# Patient Record
Sex: FEMALE | Race: WHITE | NOT HISPANIC OR LATINO
[De-identification: names, ages, dates, MRNs, and addresses within clinical notes are randomized per-mention and may not be internally consistent; named-entity substitution may affect disease eponyms.]

---

## 2021-06-10 PROBLEM — Z00.00 ENCOUNTER FOR PREVENTIVE HEALTH EXAMINATION: Status: ACTIVE | Noted: 2021-06-10

## 2021-06-23 ENCOUNTER — APPOINTMENT (OUTPATIENT)
Dept: CARDIOLOGY | Facility: HOSPITAL | Age: 71
End: 2021-06-23

## 2021-06-23 ENCOUNTER — OUTPATIENT (OUTPATIENT)
Dept: OUTPATIENT SERVICES | Facility: HOSPITAL | Age: 71
LOS: 1 days | End: 2021-06-23
Payer: MEDICARE

## 2021-06-23 DIAGNOSIS — R07.9 CHEST PAIN, UNSPECIFIED: ICD-10-CM

## 2021-06-23 PROCEDURE — 75574 CT ANGIO HRT W/3D IMAGE: CPT | Mod: 26,MH

## 2021-06-23 PROCEDURE — 75574 CT ANGIO HRT W/3D IMAGE: CPT

## 2023-03-06 ENCOUNTER — OFFICE (OUTPATIENT)
Dept: URBAN - METROPOLITAN AREA CLINIC 109 | Facility: CLINIC | Age: 73
Setting detail: OPHTHALMOLOGY
End: 2023-03-06
Payer: MEDICARE

## 2023-03-06 DIAGNOSIS — H35.54: ICD-10-CM

## 2023-03-06 DIAGNOSIS — H18.423: ICD-10-CM

## 2023-03-06 DIAGNOSIS — H47.233: ICD-10-CM

## 2023-03-06 DIAGNOSIS — H43.393: ICD-10-CM

## 2023-03-06 PROCEDURE — 92250 FUNDUS PHOTOGRAPHY W/I&R: CPT | Performed by: OPHTHALMOLOGY

## 2023-03-06 PROCEDURE — 92014 COMPRE OPH EXAM EST PT 1/>: CPT | Performed by: OPHTHALMOLOGY

## 2023-03-06 ASSESSMENT — SPHEQUIV_DERIVED
OS_SPHEQUIV: 0.5
OD_SPHEQUIV: 0.5
OS_SPHEQUIV: 0.5
OD_SPHEQUIV: 0.875
OS_SPHEQUIV: 3
OD_SPHEQUIV: 0.375
OS_SPHEQUIV: 0.125
OD_SPHEQUIV: 0.5

## 2023-03-06 ASSESSMENT — REFRACTION_MANIFEST
OD_ADD: +2.50
OD_VA2: 20/20(J1+)
OS_SPHERE: +0.75
OD_AXIS: 120
OS_VA2: 20/20(J1+)
OD_CYLINDER: -0.50
OD_CYLINDER: -0.50
OS_CYLINDER: -0.50
OD_ADD: +2.50
OD_AXIS: 125
OD_AXIS: 120
OS_VA1: 20/20
OD_VA1: 20/20
OS_CYLINDER: -0.50
OD_CYLINDER: -0.75
OD_VA2: 20/20(J1+)
OD_SPHERE: +0.75
OS_AXIS: 075
OS_ADD: +2.50
OD_VA1: 20/25-
OD_SPHERE: +1.25
OS_SPHERE: +0.75
OS_ADD: +2.50
OD_ADD: +2.25
OS_AXIS: 075
OS_AXIS: 110
OS_VA2: 20/20(J1+)
OS_VA2: 20/20-
OS_VA1: 20/20-
OS_ADD: +2.25
OU_VA: 20/20-
OS_SPHERE: +3.25
OD_SPHERE: +0.75
OD_VA1: 20/20
OD_VA2: 20/20-
OS_CYLINDER: -0.50
OS_VA1: 20/20

## 2023-03-06 ASSESSMENT — SUPERFICIAL PUNCTATE KERATITIS (SPK)
OD_SPK: 2+
OS_SPK: 2+

## 2023-03-06 ASSESSMENT — PACHYMETRY
OS_CT_CORRECTION: -1
OD_CT_CORRECTION: -2
OD_CT_UM: 577
OS_CT_UM: 566

## 2023-03-06 ASSESSMENT — CONFRONTATIONAL VISUAL FIELD TEST (CVF)
OS_FINDINGS: FULL
OD_FINDINGS: FULL

## 2023-03-06 ASSESSMENT — REFRACTION_AUTOREFRACTION
OS_AXIS: 060
OD_CYLINDER: -0.75
OD_SPHERE: +0.75
OD_AXIS: 123
OS_CYLINDER: -0.75
OS_SPHERE: +0.50

## 2023-03-06 ASSESSMENT — AXIALLENGTH_DERIVED
OS_AL: 21.989
OS_AL: 22.869
OD_AL: 22.8181
OD_AL: 23.0018
OS_AL: 23.0071
OD_AL: 22.9556
OD_AL: 22.9556
OS_AL: 22.869

## 2023-03-06 ASSESSMENT — REFRACTION_CURRENTRX
OS_CYLINDER: SPH
OD_SPHERE: +2.50
OS_OVR_VA: 20/
OD_CYLINDER: SPH
OS_SPHERE: +2.50
OD_OVR_VA: 20/

## 2023-03-06 ASSESSMENT — VISUAL ACUITY
OD_BCVA: 20/20-1
OS_BCVA: 20/20-2

## 2023-03-06 ASSESSMENT — LID EXAM ASSESSMENTS
OD_BLEPHARITIS: RLL RUL 1+
OS_BLEPHARITIS: LLL LUL 1+

## 2023-03-06 ASSESSMENT — KERATOMETRY
OD_K1POWER_DIOPTERS: 44.50
OS_K1POWER_DIOPTERS: 44.50
OD_K2POWER_DIOPTERS: 45.00
OD_AXISANGLE_DEGREES: 047
OS_K2POWER_DIOPTERS: 45.50
OS_AXISANGLE_DEGREES: 105

## 2023-03-06 ASSESSMENT — TONOMETRY
OD_IOP_MMHG: 18
OS_IOP_MMHG: 18

## 2023-05-18 ENCOUNTER — APPOINTMENT (OUTPATIENT)
Dept: ORTHOPEDIC SURGERY | Facility: CLINIC | Age: 73
End: 2023-05-18
Payer: MEDICARE

## 2023-05-18 VITALS — WEIGHT: 133 LBS | HEIGHT: 63 IN | BODY MASS INDEX: 23.57 KG/M2

## 2023-05-18 DIAGNOSIS — M19.042 PRIMARY OSTEOARTHRITIS, LEFT HAND: ICD-10-CM

## 2023-05-18 DIAGNOSIS — M18.11 UNILATERAL PRIMARY OSTEOARTHRITIS OF FIRST CARPOMETACARPAL JOINT, RIGHT HAND: ICD-10-CM

## 2023-05-18 DIAGNOSIS — M18.12 UNILATERAL PRIMARY OSTEOARTHRITIS OF FIRST CARPOMETACARPAL JOINT, LEFT HAND: ICD-10-CM

## 2023-05-18 DIAGNOSIS — M19.041 PRIMARY OSTEOARTHRITIS, RIGHT HAND: ICD-10-CM

## 2023-05-18 DIAGNOSIS — E11.9 TYPE 2 DIABETES MELLITUS W/OUT COMPLICATIONS: ICD-10-CM

## 2023-05-18 PROCEDURE — 73130 X-RAY EXAM OF HAND: CPT | Mod: LT

## 2023-05-18 PROCEDURE — J3490M: CUSTOM | Mod: NC

## 2023-05-18 PROCEDURE — 20600 DRAIN/INJ JOINT/BURSA W/O US: CPT | Mod: 50

## 2023-05-18 PROCEDURE — 99213 OFFICE O/P EST LOW 20 MIN: CPT | Mod: 25

## 2023-05-18 NOTE — PHYSICAL EXAM
[de-identified] : R hand: \par +thumb CMC swelling \par +thumb CMC tenderness \par Decreased thumb ROM \par +Basal grind test\par \par L hand: \par +thumb CMC swelling \par +thumb CMC tenderness \par Decreased thumb ROM \par +Basal grind test\par \par xrays bilateral hands severe thumb CMC

## 2023-05-18 NOTE — HISTORY OF PRESENT ILLNESS
[Gradual] : gradual [7] : 7 [5] : 5 [Dull/Aching] : dull/aching [Radiating] : radiating [Tingling] : tingling [Nothing helps with pain getting better] : Nothing helps with pain getting better [de-identified] : Bilateral thumb CMC pain and swelling\par hand pain  [] : no [FreeTextEntry1] : hands/ thumbs  [FreeTextEntry3] : few years  [FreeTextEntry5] : she has pain and numbness [de-identified] : activity

## 2023-05-18 NOTE — ASSESSMENT
[FreeTextEntry1] : Bilateral Thumb CMC injection was performed because of pain inflammation and stiffness\par Anesthesia: ethyl chloride sprayed topically\par Celestone 6mg: An injection of Celestone 1cc\par Lidocaine: An injection of Lidocaine 1% 1cc\par Marcaine: An injection of Marcaine 0.5% 1cc\par x2\par \par Patient has tried OTC's including aspirin, Ibuprofen, Aleve etc or prescription NSAIDS, and/or exercises at home and/ or\par physical therapy without satisfactory response.\par After verbal consent using sterile preparation and technique. The risks, benefits, and alternatives to cortisone injection\par were explained in full to the patient. Risks outlined include but are not limited to infection, sepsis, bleeding, scarring, skin\par discoloration, temporary increase in pain, syncopal episode, failure to resolve symptoms, allergic reaction, symptom\par recurrence, and elevation of blood sugar in diabetics. Patient understood the risks. All questions were answered. After\par discussion of options, patient requested an injection. Oral informed consent was obtained and sterile prep was done of the\par injection site. Sterile technique was utilized for the procedure including the preparation of the solutions used for the\par injection. Patient tolerated the procedure well. Advised to ice the injection site this evening.\par Prep with betadine locally to site. Sterile technique used

## 2023-06-27 ENCOUNTER — APPOINTMENT (OUTPATIENT)
Dept: ORTHOPEDIC SURGERY | Facility: CLINIC | Age: 73
End: 2023-06-27

## 2024-03-01 ENCOUNTER — APPOINTMENT (OUTPATIENT)
Dept: ORTHOPEDIC SURGERY | Facility: CLINIC | Age: 74
End: 2024-03-01
Payer: MEDICARE

## 2024-03-01 VITALS — BODY MASS INDEX: 23.04 KG/M2 | WEIGHT: 130 LBS | HEIGHT: 63 IN

## 2024-03-01 DIAGNOSIS — J45.909 UNSPECIFIED ASTHMA, UNCOMPLICATED: ICD-10-CM

## 2024-03-01 DIAGNOSIS — Z78.9 OTHER SPECIFIED HEALTH STATUS: ICD-10-CM

## 2024-03-01 DIAGNOSIS — M54.16 RADICULOPATHY, LUMBAR REGION: ICD-10-CM

## 2024-03-01 PROCEDURE — 72170 X-RAY EXAM OF PELVIS: CPT

## 2024-03-01 PROCEDURE — 72110 X-RAY EXAM L-2 SPINE 4/>VWS: CPT

## 2024-03-01 PROCEDURE — 99214 OFFICE O/P EST MOD 30 MIN: CPT

## 2024-03-01 RX ORDER — ALBUTEROL SULFATE 90 UG/1
INHALANT RESPIRATORY (INHALATION)
Refills: 0 | Status: ACTIVE | COMMUNITY

## 2024-03-01 RX ORDER — ALIROCUMAB 75 MG/ML
75 INJECTION, SOLUTION SUBCUTANEOUS
Refills: 0 | Status: ACTIVE | COMMUNITY

## 2024-03-01 RX ORDER — MONTELUKAST 10 MG/1
TABLET, FILM COATED ORAL
Refills: 0 | Status: ACTIVE | COMMUNITY

## 2024-03-01 RX ORDER — LEVOTHYROXINE SODIUM 0.17 MG/1
TABLET ORAL
Refills: 0 | Status: ACTIVE | COMMUNITY

## 2024-03-01 NOTE — HISTORY OF PRESENT ILLNESS
[Lower back] : lower back [Dull/Aching] : dull/aching [Localized] : localized [Constant] : constant [Nothing helps with pain getting better] : Nothing helps with pain getting better [Bending forward] : bending forward [de-identified] : 3/1/24: 72 yo f with worsening lower back pain in10/2023; no specific injury but she is taking care of her sick mom and aunt. Has had intermitent pain for years starting at age 12. The pain is across the lower back, but more to the right side of the back. Pain worse with getting up from a seated position.  No recent radiation of pain. No n/t.  No loss of b/b control.   She saw her PCP where an [facility] was [facility] She was given a muscle relaxant which she didnt take.  No PT/chirocare/acupuncture/injections/no prior spine surgeries  many allergies to antibiotics; amoxicillin, cipro, macrobid, omnicef, septra and bactrim  Pmhx: Asthma, osteoporosis, hypothyroid, hypoglycemia, hiatal hernia Pshx: Partial thyroidectomy, partial lobectomy, appendectomy   Had Thyroid cancer,  pre-cervical cancer No diabetes  Occupation: Retired  MRI L-spine 11/23/23 ([facility]) T11-12: Central disc herniation indents the thecal sac. No neuroforaminal stenosis. T12-L1: Central subligamentous disc herniation indents the thecal sac. No neuroforaminal stenosis. L1-2: Broad-based right central disc herniation impinges the thecal sac. No neuroforaminal stenosis. L2-3: Disc bulge indents the thecal sac. The bulge mildly narrows the neuroforamina. L3-4: Disc bulge indents the thecal sac. The bulge causes mild to moderate bilateral neuroforaminal stenosis. L4-5: Bilateral foraminal disc herniations cause moderate bilateral neuroforaminal stenosis impinging the exiting L4 nerves. Left foraminal annular tear. Superimposed disc bulge indents the thecal sac. Ligamentum flavum thickening. Severe facet arthropathy. L5-S1: Broad-based left subarticular and foraminal disc herniation indents the left lateral recess and causes severe left foraminal stenosis impinging exiting left L5 nerve. Superimposed disc bulge indents the thecal sac and causes moderate to severe right foraminal stenosis impinging the exiting right L5 nerve. The bulge indents the thecal sac. Severe facet arthropathy. Multiple probable cysts within the liver and kidneys measuring up to 2 cm. Consider abdominal ultrasound if clinically indicated  she is pending evaluation of the kidney/liver cysts with GI   xrays today: L spine - spondylosis , slight spondylolisthesis L4-5, LOSS OF disc hieght L5-S1  AP PELVIS - NEGATIVE  [] : no [FreeTextEntry5] : 72 yo F presenting with low back pain that started years ago. Felt numbness in b/l legs at age 12 after performing somersault. Pain has been present since. No radiating pain at this time. Saw PCP; had MRI L Spine completed @ [facility] 10/2023.

## 2024-03-01 NOTE — DATA REVIEWED
[MRI] : MRI [I independently reviewed and interpreted images and report] : I independently reviewed and interpreted images and report [Lumbar Spine] : lumbar spine [I reviewed the films/CD and agree] : I reviewed the films/CD and agree

## 2024-03-01 NOTE — DISCUSSION/SUMMARY
[Medication Risks Reviewed] : Medication risks reviewed [de-identified] : reviewed the case and the imaging with her  diffuse lumbar spondylosis with spondylolisthesis L4-5  rec PT   discussed the osteoporosis would rec she treat it - discussion of this today

## 2024-03-27 ENCOUNTER — OFFICE (OUTPATIENT)
Dept: URBAN - METROPOLITAN AREA CLINIC 109 | Facility: CLINIC | Age: 74
Setting detail: OPHTHALMOLOGY
End: 2024-03-27
Payer: MEDICARE

## 2024-03-27 DIAGNOSIS — H47.233: ICD-10-CM

## 2024-03-27 PROCEDURE — 92012 INTRM OPH EXAM EST PATIENT: CPT | Performed by: OPHTHALMOLOGY

## 2024-03-27 PROCEDURE — 92020 GONIOSCOPY: CPT | Performed by: OPHTHALMOLOGY

## 2024-03-27 PROCEDURE — 92133 CPTRZD OPH DX IMG PST SGM ON: CPT | Performed by: OPHTHALMOLOGY

## 2024-03-27 PROCEDURE — 92083 EXTENDED VISUAL FIELD XM: CPT | Performed by: OPHTHALMOLOGY

## 2024-03-27 ASSESSMENT — REFRACTION_CURRENTRX
OD_CYLINDER: SPH
OS_SPHERE: +2.50
OD_OVR_VA: 20/
OS_CYLINDER: SPH
OS_OVR_VA: 20/
OD_SPHERE: +2.50

## 2024-03-27 ASSESSMENT — REFRACTION_MANIFEST
OS_SPHERE: +3.25
OD_ADD: +2.50
OS_SPHERE: +0.75
OS_ADD: +2.50
OD_VA1: 20/20
OD_SPHERE: +0.75
OS_CYLINDER: -0.50
OD_SPHERE: +1.25
OD_ADD: +2.25
OS_VA2: 20/20(J1+)
OU_VA: 20/20-
OS_VA2: 20/20-
OD_SPHERE: +0.75
OD_CYLINDER: -0.75
OD_VA1: 20/25-
OD_VA2: 20/20(J1+)
OS_VA1: 20/20-
OS_VA1: 20/20
OD_VA2: 20/20(J1+)
OS_VA1: 20/20
OD_VA2: 20/20-
OS_ADD: +2.25
OD_AXIS: 120
OD_CYLINDER: -0.50
OS_CYLINDER: -0.50
OS_VA2: 20/20(J1+)
OD_CYLINDER: -0.50
OS_AXIS: 110
OS_CYLINDER: -0.50
OS_AXIS: 075
OS_ADD: +2.50
OD_AXIS: 125
OS_SPHERE: +0.75
OD_ADD: +2.50
OS_AXIS: 075
OD_VA1: 20/20
OD_AXIS: 120

## 2024-03-27 ASSESSMENT — SPHEQUIV_DERIVED
OD_SPHEQUIV: 0.875
OS_SPHEQUIV: 0.5
OS_SPHEQUIV: 3
OD_SPHEQUIV: 0.5
OD_SPHEQUIV: 0.5
OS_SPHEQUIV: 0.5

## 2024-03-27 ASSESSMENT — LID EXAM ASSESSMENTS
OS_BLEPHARITIS: LLL LUL 1+
OD_BLEPHARITIS: RLL RUL 1+

## 2024-06-24 ENCOUNTER — APPOINTMENT (OUTPATIENT)
Dept: ORTHOPEDIC SURGERY | Facility: CLINIC | Age: 74
End: 2024-06-24
Payer: MEDICARE

## 2024-06-24 DIAGNOSIS — M17.0 BILATERAL PRIMARY OSTEOARTHRITIS OF KNEE: ICD-10-CM

## 2024-06-24 DIAGNOSIS — M43.16 SPONDYLOLISTHESIS, LUMBAR REGION: ICD-10-CM

## 2024-06-24 PROCEDURE — 99214 OFFICE O/P EST MOD 30 MIN: CPT

## 2024-06-25 ENCOUNTER — APPOINTMENT (OUTPATIENT)
Dept: ORTHOPEDIC SURGERY | Facility: CLINIC | Age: 74
End: 2024-06-25

## 2025-04-02 ENCOUNTER — OFFICE (OUTPATIENT)
Dept: URBAN - METROPOLITAN AREA CLINIC 109 | Facility: CLINIC | Age: 75
Setting detail: OPHTHALMOLOGY
End: 2025-04-02
Payer: MEDICARE

## 2025-04-02 DIAGNOSIS — H43.393: ICD-10-CM

## 2025-04-02 DIAGNOSIS — H16.223: ICD-10-CM

## 2025-04-02 DIAGNOSIS — H47.233: ICD-10-CM

## 2025-04-02 DIAGNOSIS — H35.54: ICD-10-CM

## 2025-04-02 DIAGNOSIS — H18.423: ICD-10-CM

## 2025-04-02 PROBLEM — H01.001 BLEPHARITIS; RIGHT UPPER LID, RIGHT LOWER LID, LEFT UPPER LID, LEFT LOWER LID: Status: ACTIVE | Noted: 2025-04-02

## 2025-04-02 PROBLEM — H01.005 BLEPHARITIS; RIGHT UPPER LID, RIGHT LOWER LID, LEFT UPPER LID, LEFT LOWER LID: Status: ACTIVE | Noted: 2025-04-02

## 2025-04-02 PROBLEM — H01.002 BLEPHARITIS; RIGHT UPPER LID, RIGHT LOWER LID, LEFT UPPER LID, LEFT LOWER LID: Status: ACTIVE | Noted: 2025-04-02

## 2025-04-02 PROBLEM — H01.004 BLEPHARITIS; RIGHT UPPER LID, RIGHT LOWER LID, LEFT UPPER LID, LEFT LOWER LID: Status: ACTIVE | Noted: 2025-04-02

## 2025-04-02 PROCEDURE — 92083 EXTENDED VISUAL FIELD XM: CPT | Performed by: OPHTHALMOLOGY

## 2025-04-02 PROCEDURE — 68761 CLOSE TEAR DUCT OPENING: CPT | Mod: 50 | Performed by: OPHTHALMOLOGY

## 2025-04-02 PROCEDURE — 92014 COMPRE OPH EXAM EST PT 1/>: CPT | Mod: 25 | Performed by: OPHTHALMOLOGY

## 2025-04-02 PROCEDURE — 92250 FUNDUS PHOTOGRAPHY W/I&R: CPT | Performed by: OPHTHALMOLOGY

## 2025-04-02 PROCEDURE — 76514 ECHO EXAM OF EYE THICKNESS: CPT | Performed by: OPHTHALMOLOGY

## 2025-04-02 ASSESSMENT — REFRACTION_MANIFEST
OS_CYLINDER: -0.50
OS_VA1: 20/20-
OD_VA2: 20/20-
OS_CYLINDER: -0.50
OD_AXIS: 120
OD_ADD: +2.25
OD_VA2: 20/20(J1+)
OS_SPHERE: +3.25
OS_AXIS: 075
OS_SPHERE: +0.75
OS_AXIS: 110
OU_VA: 20/20-
OS_VA1: 20/20
OD_CYLINDER: -0.50
OS_AXIS: 075
OD_VA1: 20/20
OD_SPHERE: +1.25
OS_VA2: 20/20-
OD_ADD: +2.50
OD_SPHERE: +0.75
OS_ADD: +2.25
OD_AXIS: 125
OS_CYLINDER: -0.50
OD_VA2: 20/20(J1+)
OD_VA1: 20/20
OS_ADD: +2.50
OD_AXIS: 120
OD_VA1: 20/25-
OD_CYLINDER: -0.50
OS_VA2: 20/20(J1+)
OS_VA2: 20/20(J1+)
OD_SPHERE: +0.75
OS_ADD: +2.50
OS_VA1: 20/20
OD_ADD: +2.50
OD_CYLINDER: -0.75
OS_SPHERE: +0.75

## 2025-04-02 ASSESSMENT — SUPERFICIAL PUNCTATE KERATITIS (SPK)
OS_SPK: 2+
OD_SPK: 2+

## 2025-04-02 ASSESSMENT — REFRACTION_CURRENTRX
OS_CYLINDER: SPH
OS_SPHERE: +2.50
OD_SPHERE: +2.50
OD_OVR_VA: 20/
OS_OVR_VA: 20/
OD_CYLINDER: SPH

## 2025-04-02 ASSESSMENT — LID EXAM ASSESSMENTS
OS_BLEPHARITIS: LLL LUL 1+
OD_BLEPHARITIS: RLL RUL 1+

## 2025-04-02 ASSESSMENT — PACHYMETRY
OS_CT_UM: 602
OD_CT_CORRECTION: -4
OD_CT_UM: 601
OS_CT_CORRECTION: -4

## 2025-04-02 ASSESSMENT — KERATOMETRY
OD_K2POWER_DIOPTERS: 45.00
OD_AXISANGLE_DEGREES: 047
OS_K1POWER_DIOPTERS: 44.50
OD_K1POWER_DIOPTERS: 44.50
OS_K2POWER_DIOPTERS: 45.50
OS_AXISANGLE_DEGREES: 105

## 2025-04-02 ASSESSMENT — PUNCTA - ASSESSMENT
OS_PUNCTA: 3MON PLUG
OD_PUNCTA: 3MON PLUG

## 2025-04-02 ASSESSMENT — REFRACTION_AUTOREFRACTION
OD_CYLINDER: -0.75
OD_SPHERE: +0.50
OD_AXIS: 100
OS_SPHERE: +0.25
OS_AXIS: 062
OS_CYLINDER: -0.75

## 2025-04-02 ASSESSMENT — VISUAL ACUITY
OS_BCVA: 20/25-2
OD_BCVA: 20/25-1

## 2025-04-02 ASSESSMENT — TONOMETRY
OS_IOP_MMHG: 16
OD_IOP_MMHG: 16

## 2025-04-02 ASSESSMENT — CONFRONTATIONAL VISUAL FIELD TEST (CVF)
OS_FINDINGS: FULL
OD_FINDINGS: FULL

## 2025-04-14 ENCOUNTER — APPOINTMENT (OUTPATIENT)
Dept: ORTHOPEDIC SURGERY | Facility: CLINIC | Age: 75
End: 2025-04-14
Payer: MEDICARE

## 2025-04-14 VITALS — HEIGHT: 63 IN | BODY MASS INDEX: 26.05 KG/M2 | WEIGHT: 147 LBS

## 2025-04-14 DIAGNOSIS — M25.519 PAIN IN UNSPECIFIED SHOULDER: ICD-10-CM

## 2025-04-14 DIAGNOSIS — M54.2 CERVICALGIA: ICD-10-CM

## 2025-04-14 DIAGNOSIS — Z86.39 PERSONAL HISTORY OF OTHER ENDOCRINE, NUTRITIONAL AND METABOLIC DISEASE: ICD-10-CM

## 2025-04-14 DIAGNOSIS — M75.41 IMPINGEMENT SYNDROME OF RIGHT SHOULDER: ICD-10-CM

## 2025-04-14 DIAGNOSIS — M47.812 SPONDYLOSIS W/OUT MYELOPATHY OR RADICULOPATHY, CERVICAL REGION: ICD-10-CM

## 2025-04-14 PROCEDURE — 73030 X-RAY EXAM OF SHOULDER: CPT | Mod: RT

## 2025-04-14 PROCEDURE — 72040 X-RAY EXAM NECK SPINE 2-3 VW: CPT

## 2025-04-14 PROCEDURE — 99214 OFFICE O/P EST MOD 30 MIN: CPT

## 2025-04-14 PROCEDURE — 73010 X-RAY EXAM OF SHOULDER BLADE: CPT | Mod: RT

## 2025-04-14 RX ORDER — MELOXICAM 15 MG/1
15 TABLET ORAL
Qty: 30 | Refills: 0 | Status: ACTIVE | COMMUNITY
Start: 2025-04-14 | End: 1900-01-01

## 2025-06-30 ENCOUNTER — APPOINTMENT (OUTPATIENT)
Dept: ORTHOPEDIC SURGERY | Facility: CLINIC | Age: 75
End: 2025-06-30

## 2025-08-29 ENCOUNTER — APPOINTMENT (OUTPATIENT)
Dept: ORTHOPEDIC SURGERY | Facility: CLINIC | Age: 75
End: 2025-08-29
Payer: MEDICARE

## 2025-08-29 ENCOUNTER — RESULT CHARGE (OUTPATIENT)
Age: 75
End: 2025-08-29

## 2025-08-29 ENCOUNTER — APPOINTMENT (OUTPATIENT)
Dept: MRI IMAGING | Facility: CLINIC | Age: 75
End: 2025-08-29
Payer: MEDICARE

## 2025-08-29 VITALS — BODY MASS INDEX: 25.69 KG/M2 | WEIGHT: 145 LBS | HEIGHT: 63 IN

## 2025-08-29 DIAGNOSIS — S80.01XD CONTUSION OF RIGHT KNEE, SUBSEQUENT ENCOUNTER: ICD-10-CM

## 2025-08-29 DIAGNOSIS — S80.01XA CONTUSION OF RIGHT KNEE, INITIAL ENCOUNTER: ICD-10-CM

## 2025-08-29 PROCEDURE — 73564 X-RAY EXAM KNEE 4 OR MORE: CPT | Mod: 50

## 2025-08-29 PROCEDURE — 73721 MRI JNT OF LWR EXTRE W/O DYE: CPT | Mod: 26,RT

## 2025-08-29 PROCEDURE — G2211 COMPLEX E/M VISIT ADD ON: CPT

## 2025-08-29 PROCEDURE — 99213 OFFICE O/P EST LOW 20 MIN: CPT

## 2025-08-29 RX ORDER — DICLOFENAC SODIUM 10 MG/G
1 GEL TOPICAL 3 TIMES DAILY
Qty: 1 | Refills: 3 | Status: ACTIVE | COMMUNITY
Start: 2025-08-29 | End: 1900-01-01

## 2025-09-02 ENCOUNTER — APPOINTMENT (OUTPATIENT)
Dept: ORTHOPEDIC SURGERY | Facility: CLINIC | Age: 75
End: 2025-09-02
Payer: MEDICARE

## 2025-09-02 DIAGNOSIS — M17.12 UNILATERAL PRIMARY OSTEOARTHRITIS, LEFT KNEE: ICD-10-CM

## 2025-09-02 DIAGNOSIS — S82.001A UNSPECIFIED FRACTURE OF RIGHT PATELLA, INITIAL ENCOUNTER FOR CLOSED FRACTURE: ICD-10-CM

## 2025-09-02 DIAGNOSIS — M17.11 UNILATERAL PRIMARY OSTEOARTHRITIS, RIGHT KNEE: ICD-10-CM

## 2025-09-02 PROCEDURE — 99214 OFFICE O/P EST MOD 30 MIN: CPT | Mod: 57

## 2025-09-20 ENCOUNTER — APPOINTMENT (OUTPATIENT)
Dept: ORTHOPEDIC SURGERY | Facility: CLINIC | Age: 75
End: 2025-09-20
Payer: MEDICARE

## 2025-09-20 DIAGNOSIS — S82.001A UNSPECIFIED FRACTURE OF RIGHT PATELLA, INITIAL ENCOUNTER FOR CLOSED FRACTURE: ICD-10-CM

## 2025-09-20 PROCEDURE — 73560 X-RAY EXAM OF KNEE 1 OR 2: CPT | Mod: RT

## 2025-09-20 PROCEDURE — 99024 POSTOP FOLLOW-UP VISIT: CPT
